# Patient Record
Sex: FEMALE | Race: WHITE | NOT HISPANIC OR LATINO | URBAN - METROPOLITAN AREA
[De-identification: names, ages, dates, MRNs, and addresses within clinical notes are randomized per-mention and may not be internally consistent; named-entity substitution may affect disease eponyms.]

---

## 2018-08-28 ENCOUNTER — INPATIENT (INPATIENT)
Facility: HOSPITAL | Age: 83
LOS: 2 days | Discharge: NURSING, REHAB OR LONG-TERM CARE | DRG: 482 | End: 2018-08-31
Attending: HOSPITALIST | Admitting: STUDENT IN AN ORGANIZED HEALTH CARE EDUCATION/TRAINING PROGRAM
Payer: MEDICARE

## 2018-08-28 ENCOUNTER — TRANSCRIPTION ENCOUNTER (OUTPATIENT)
Age: 83
End: 2018-08-28

## 2018-08-28 VITALS
TEMPERATURE: 98 F | DIASTOLIC BLOOD PRESSURE: 95 MMHG | HEART RATE: 94 BPM | OXYGEN SATURATION: 95 % | RESPIRATION RATE: 14 BRPM | SYSTOLIC BLOOD PRESSURE: 169 MMHG | HEIGHT: 64 IN | WEIGHT: 134.92 LBS

## 2018-08-28 DIAGNOSIS — Z29.9 ENCOUNTER FOR PROPHYLACTIC MEASURES, UNSPECIFIED: ICD-10-CM

## 2018-08-28 DIAGNOSIS — S72.009A FRACTURE OF UNSPECIFIED PART OF NECK OF UNSPECIFIED FEMUR, INITIAL ENCOUNTER FOR CLOSED FRACTURE: ICD-10-CM

## 2018-08-28 LAB
ALBUMIN SERPL ELPH-MCNC: 3.4 G/DL — SIGNIFICANT CHANGE UP (ref 3.3–5)
ALP SERPL-CCNC: 79 U/L — SIGNIFICANT CHANGE UP (ref 40–120)
ALT FLD-CCNC: 20 U/L — SIGNIFICANT CHANGE UP (ref 12–78)
ANION GAP SERPL CALC-SCNC: 9 MMOL/L — SIGNIFICANT CHANGE UP (ref 5–17)
APTT BLD: 27.3 SEC — LOW (ref 27.5–37.4)
AST SERPL-CCNC: 17 U/L — SIGNIFICANT CHANGE UP (ref 15–37)
BASOPHILS # BLD AUTO: 0.03 K/UL — SIGNIFICANT CHANGE UP (ref 0–0.2)
BASOPHILS NFR BLD AUTO: 0.3 % — SIGNIFICANT CHANGE UP (ref 0–2)
BILIRUB SERPL-MCNC: 0.5 MG/DL — SIGNIFICANT CHANGE UP (ref 0.2–1.2)
BUN SERPL-MCNC: 20 MG/DL — SIGNIFICANT CHANGE UP (ref 7–23)
CALCIUM SERPL-MCNC: 8.5 MG/DL — SIGNIFICANT CHANGE UP (ref 8.5–10.1)
CHLORIDE SERPL-SCNC: 105 MMOL/L — SIGNIFICANT CHANGE UP (ref 96–108)
CO2 SERPL-SCNC: 27 MMOL/L — SIGNIFICANT CHANGE UP (ref 22–31)
CREAT SERPL-MCNC: 0.78 MG/DL — SIGNIFICANT CHANGE UP (ref 0.5–1.3)
EOSINOPHIL # BLD AUTO: 0.07 K/UL — SIGNIFICANT CHANGE UP (ref 0–0.5)
EOSINOPHIL NFR BLD AUTO: 0.7 % — SIGNIFICANT CHANGE UP (ref 0–6)
GLUCOSE SERPL-MCNC: 179 MG/DL — HIGH (ref 70–99)
HCT VFR BLD CALC: 45.7 % — HIGH (ref 34.5–45)
HGB BLD-MCNC: 15.1 G/DL — SIGNIFICANT CHANGE UP (ref 11.5–15.5)
IMM GRANULOCYTES NFR BLD AUTO: 0.2 % — SIGNIFICANT CHANGE UP (ref 0–1.5)
INR BLD: 1.02 RATIO — SIGNIFICANT CHANGE UP (ref 0.88–1.16)
LYMPHOCYTES # BLD AUTO: 0.78 K/UL — LOW (ref 1–3.3)
LYMPHOCYTES # BLD AUTO: 7.4 % — LOW (ref 13–44)
MCHC RBC-ENTMCNC: 30.4 PG — SIGNIFICANT CHANGE UP (ref 27–34)
MCHC RBC-ENTMCNC: 33 GM/DL — SIGNIFICANT CHANGE UP (ref 32–36)
MCV RBC AUTO: 92 FL — SIGNIFICANT CHANGE UP (ref 80–100)
MONOCYTES # BLD AUTO: 0.87 K/UL — SIGNIFICANT CHANGE UP (ref 0–0.9)
MONOCYTES NFR BLD AUTO: 8.2 % — SIGNIFICANT CHANGE UP (ref 2–14)
NEUTROPHILS # BLD AUTO: 8.81 K/UL — HIGH (ref 1.8–7.4)
NEUTROPHILS NFR BLD AUTO: 83.2 % — HIGH (ref 43–77)
PLATELET # BLD AUTO: 219 K/UL — SIGNIFICANT CHANGE UP (ref 150–400)
POTASSIUM SERPL-MCNC: 3.8 MMOL/L — SIGNIFICANT CHANGE UP (ref 3.5–5.3)
POTASSIUM SERPL-SCNC: 3.8 MMOL/L — SIGNIFICANT CHANGE UP (ref 3.5–5.3)
PROT SERPL-MCNC: 6.7 G/DL — SIGNIFICANT CHANGE UP (ref 6–8.3)
PROTHROM AB SERPL-ACNC: 11.1 SEC — SIGNIFICANT CHANGE UP (ref 9.8–12.7)
RBC # BLD: 4.97 M/UL — SIGNIFICANT CHANGE UP (ref 3.8–5.2)
RBC # FLD: 13.7 % — SIGNIFICANT CHANGE UP (ref 10.3–14.5)
SODIUM SERPL-SCNC: 141 MMOL/L — SIGNIFICANT CHANGE UP (ref 135–145)
WBC # BLD: 10.58 K/UL — HIGH (ref 3.8–10.5)
WBC # FLD AUTO: 10.58 K/UL — HIGH (ref 3.8–10.5)

## 2018-08-28 PROCEDURE — 73552 X-RAY EXAM OF FEMUR 2/>: CPT | Mod: 26,RT

## 2018-08-28 PROCEDURE — 99223 1ST HOSP IP/OBS HIGH 75: CPT | Mod: AI,GC

## 2018-08-28 PROCEDURE — 93010 ELECTROCARDIOGRAM REPORT: CPT

## 2018-08-28 PROCEDURE — 99285 EMERGENCY DEPT VISIT HI MDM: CPT

## 2018-08-28 PROCEDURE — 73502 X-RAY EXAM HIP UNI 2-3 VIEWS: CPT | Mod: 26,RT

## 2018-08-28 PROCEDURE — 71045 X-RAY EXAM CHEST 1 VIEW: CPT | Mod: 26

## 2018-08-28 RX ORDER — HEPARIN SODIUM 5000 [USP'U]/ML
5000 INJECTION INTRAVENOUS; SUBCUTANEOUS EVERY 12 HOURS
Qty: 0 | Refills: 0 | Status: COMPLETED | OUTPATIENT
Start: 2018-08-28 | End: 2018-08-28

## 2018-08-28 RX ORDER — TRAMADOL HYDROCHLORIDE 50 MG/1
25 TABLET ORAL EVERY 6 HOURS
Qty: 0 | Refills: 0 | Status: DISCONTINUED | OUTPATIENT
Start: 2018-08-28 | End: 2018-08-29

## 2018-08-28 RX ORDER — TRAMADOL HYDROCHLORIDE 50 MG/1
50 TABLET ORAL EVERY 6 HOURS
Qty: 0 | Refills: 0 | Status: DISCONTINUED | OUTPATIENT
Start: 2018-08-28 | End: 2018-08-29

## 2018-08-28 RX ORDER — ACETAMINOPHEN 500 MG
650 TABLET ORAL ONCE
Qty: 0 | Refills: 0 | Status: COMPLETED | OUTPATIENT
Start: 2018-08-28 | End: 2018-08-28

## 2018-08-28 RX ORDER — MORPHINE SULFATE 50 MG/1
1 CAPSULE, EXTENDED RELEASE ORAL ONCE
Qty: 0 | Refills: 0 | Status: DISCONTINUED | OUTPATIENT
Start: 2018-08-28 | End: 2018-08-29

## 2018-08-28 RX ORDER — SODIUM CHLORIDE 9 MG/ML
1000 INJECTION INTRAMUSCULAR; INTRAVENOUS; SUBCUTANEOUS
Qty: 0 | Refills: 0 | Status: DISCONTINUED | OUTPATIENT
Start: 2018-08-28 | End: 2018-08-29

## 2018-08-28 RX ORDER — SODIUM CHLORIDE 9 MG/ML
3 INJECTION INTRAMUSCULAR; INTRAVENOUS; SUBCUTANEOUS ONCE
Qty: 0 | Refills: 0 | Status: COMPLETED | OUTPATIENT
Start: 2018-08-28 | End: 2018-08-28

## 2018-08-28 RX ORDER — ASPIRIN/CALCIUM CARB/MAGNESIUM 324 MG
81 TABLET ORAL DAILY
Qty: 0 | Refills: 0 | Status: DISCONTINUED | OUTPATIENT
Start: 2018-08-28 | End: 2018-08-28

## 2018-08-28 RX ORDER — ASPIRIN/CALCIUM CARB/MAGNESIUM 324 MG
1 TABLET ORAL
Qty: 0 | Refills: 0 | COMMUNITY

## 2018-08-28 RX ADMIN — SODIUM CHLORIDE 3 MILLILITER(S): 9 INJECTION INTRAMUSCULAR; INTRAVENOUS; SUBCUTANEOUS at 17:50

## 2018-08-28 RX ADMIN — Medication 650 MILLIGRAM(S): at 17:10

## 2018-08-28 RX ADMIN — TRAMADOL HYDROCHLORIDE 50 MILLIGRAM(S): 50 TABLET ORAL at 21:23

## 2018-08-28 RX ADMIN — HEPARIN SODIUM 5000 UNIT(S): 5000 INJECTION INTRAVENOUS; SUBCUTANEOUS at 21:24

## 2018-08-28 NOTE — ED ADULT NURSE NOTE - OBJECTIVE STATEMENT
Pt. stated, " I fell while walking out of [employer]". Pt. complaining of right thigh pain. Denied hitting head or loss of consciousness. Daughter present at bedside during assessment. Pt. stated, " I fell while walking out of [employer]". Pt. complaining of right thigh pain. Denied hitting head or loss of consciousness. Daughter present at bedside during assessment. Ecchymosis noted to left hip, per pt. she "bumped" it on a dresser. Right hip and lower extremity noted with ecchymosis. Abrasion noted to right elbow. Blanchable redness noted to sacrum.

## 2018-08-28 NOTE — ED ADULT NURSE NOTE - NSIMPLEMENTINTERV_GEN_ALL_ED
Implemented All Fall with Harm Risk Interventions:  [place] to call system. Call bell, personal items and telephone within reach. Instruct patient to call for assistance. Room bathroom lighting operational. Non-slip footwear when patient is off stretcher. Physically safe environment: no spills, clutter or unnecessary equipment. Stretcher in lowest position, wheels locked, appropriate side rails in place. Provide visual cue, wrist band, yellow gown, etc. Monitor gait and stability. Monitor for mental status changes and reorient to person, place, and time. Review medications for side effects contributing to fall risk. Reinforce activity limits and safety measures with patient and family. Provide visual clues: red socks.

## 2018-08-28 NOTE — ED PROVIDER NOTE - PHYSICAL EXAMINATION
Spine Exam:     Cervical: No erythema, ecchymosis, or visible deformity. No midline tenderness or step-off appreciated on palpation. No paravertebral tenderness. No muscle spasm. FROM. NEG NEXUS criteria.       MS R LE: + RIGHT HIP TTP. DECREASED ROM OF HIP. FROM OF KNEE AND ANKLE. SENSATION GROSSLY INTACT.   PV:+ 2 DP. Spine Exam:     Cervical: No erythema, ecchymosis, or visible deformity. No midline tenderness or step-off appreciated on palpation. No paravertebral tenderness. No muscle spasm. FROM. NEG NEXUS criteria.       MS R LE: + RIGHT HIP TTP. DECREASED ROM OF HIP. FROM OF KNEE AND ANKLE. SENSATION GROSSLY INTACT. R LE SHORTER THAN LEFT   PV:+ 2 DP.

## 2018-08-28 NOTE — H&P ADULT - HISTORY OF PRESENT ILLNESS
89yo female with no significant past medical history presents with c/o right hip pain. Patient states she was walking in a parking after lunch, and tripped over an uneven surface. Patient states that she fell onto her right side, bracing herself with her posterior right arm. Patient denies striking her head or any LOC. Patient reports experiencing pain when she attempted to rise from the floor, and was transported to [facility] for further medical evaluation.    In the ED, patient vitals were T(F): 98.2, HR: 94, BP: 169/95, RR: 14, SpO2: 95% on RA. CBC exhibited WBC 10.58, Hgb 15.1, Hct 45.7, Plt 219. CMP exhibited Na 141, K 3.8, Cl 105, CO2 27, BUN 20, Cr 0.78, Gluc 179. EKG exhibited NSR with voltage criteria for LVH. CXR showed left atelectasis vs. scarring at base. Xray of right femur showed an impacted intertrochanteric fracture, with right femoral head intact without cortical collapse or osteonecrosis. In the ED, patient was administered Tylenol 650mg PO x 1.

## 2018-08-28 NOTE — CONSULT NOTE ADULT - SUBJECTIVE AND OBJECTIVE BOX
89y Female community ambulatory presents c/o R hip pain and inability to ambulate sp mechanical fall. Denies HS/LOC. Denies numbness/tingling. Denies fever/chills. Denies pain/injury elsewhere.     HEALTH ISSUES - PROBLEM Dx:  Preventive measure: Preventive measure  Hip fracture: Hip fracture        MEDICATIONS  (STANDING):  multivitamin 1 Tablet(s) Oral daily  sodium chloride 0.9%. 1000 milliLiter(s) IV Continuous <Continuous>    Allergies    fresh fruit (Short breath)  No Known Drug Allergies  Tree Nuts (Short breath)    Intolerances                            15.1   10.58 )-----------( 219      ( 28 Aug 2018 17:59 )             45.7     28 Aug 2018 17:59    141    |  105    |  20     ----------------------------<  179    3.8     |  27     |  0.78     Ca    8.5        28 Aug 2018 17:59    TPro  6.7    /  Alb  3.4    /  TBili  0.5    /  DBili  x      /  AST  17     /  ALT  20     /  AlkPhos  79     28 Aug 2018 17:59    PT/INR - ( 28 Aug 2018 17:59 )   PT: 11.1 sec;   INR: 1.02 ratio         PTT - ( 28 Aug 2018 17:59 )  PTT:27.3 sec  Vital Signs Last 24 Hrs  T(C): 36.8 (08-28-18 @ 21:27), Max: 36.8 (08-28-18 @ 16:24)  T(F): 98.3 (08-28-18 @ 21:27), Max: 98.3 (08-28-18 @ 21:27)  HR: 88 (08-28-18 @ 21:27) (84 - 94)  BP: 161/83 (08-28-18 @ 21:27) (161/83 - 169/95)  BP(mean): --  RR: 15 (08-28-18 @ 21:27) (14 - 16)  SpO2: 96% (08-28-18 @ 21:27) (95% - 96%)  Imaging: XR demonstates R hip IT fracture    Physical Exam  Gen: NAD  RLE: skin intact, unable to SLR RLE, + log roll RLE, +ttp hip/groin, no ttp elsewhere, +ehl/fhl/ta/gs function, no calf ttp, dp/pt pulse intact, compartments soft  Secondary survey: benign, nv intact, able to SLR contralateral leg, negative log roll contralateral leg, no bony ttp elsewhere    A/P: 89y Female with R hip IT fracture  Pain control  NWB RLE, bedrest  FU labs/imaging  Ca/Vit D  Outpt osteoporosis workup  Admit to medical team  Medical clearance/optimization for OR  Discussed with Dr. Hager  Plan for OR tomorrow for R hip IMN  NPO after midnight  IVF while NPO  Hold AC after midnight

## 2018-08-28 NOTE — H&P ADULT - ASSESSMENT
89yo female with no significant past medical history presents with c/o right hip pain, admitted for right intertrochanteric fracture 2/2 mechanical fall

## 2018-08-28 NOTE — ED PROVIDER NOTE - PROGRESS NOTE DETAILS
consulted ortho dr lawler assistant asha Kowalski who advised to admit to medicine. discussed with pt who agrees consulted ortho dr lawler assistant asha Kowalski who advised to admit to medicine. discussed with pt who agrees. will admit to dr barnhart

## 2018-08-28 NOTE — H&P ADULT - NSHPREVIEWOFSYSTEMS_GEN_ALL_CORE
Constitutional: denies fever, chills, diaphoresis   HEENT: denies blurry vision, difficulty hearing  Respiratory: denies SOB, ARDON, cough, sputum production, wheezing, hemoptysis  Cardiovascular: denies CP, palpitations, edema  Gastrointestinal: denies nausea, vomiting, diarrhea, constipation, abdominal pain, melena, hematochezia   Genitourinary: denies dysuria, frequency, urgency, hematuria   Skin/Breast: denies rash, itching  Musculoskeletal: + Right hip pain with movement, denies myalgias, joint swelling, muscle weakness  Neurologic: denies headache, weakness, dizziness, paresthesias, numbness/tingling  Psychiatric: denies feeling anxious, depressed, suicidal, homicidal thoughts  Hematology/Oncology: denies bruising, tender or enlarged lymph nodes   ROS negative except as noted above

## 2018-08-28 NOTE — H&P ADULT - ATTENDING COMMENTS
Pt has agreed to trial tramadol for pain, however she would like to avoid other opioids if possible.

## 2018-08-28 NOTE — H&P ADULT - PROBLEM SELECTOR PLAN 1
2/2 mechanical fall  Ortho consult (Dr. Sheriff) treatment recommendations appreciated  Operative management as per ortho surgery  Hold home ASA  NPO after midnight for procedure  Tylenol for 4-6, Tramadol for 7-10  PT eval post-op  No clear evidence of acute ischemia on EKG, no history of MI, active CAD, ADHF or severe valvular disease and in the setting of low risk hip fracture repair procedure, patient is optimized from cardiovascular standpoint to proceed with planned procedure with routine hemodynamic monitoring.

## 2018-08-28 NOTE — ED PROVIDER NOTE - ATTENDING CONTRIBUTION TO CARE
Pt seen and examined and d/w PA.  agree with a and p.  pt is a 89 yo female sp slip and fall onto buttocks pw right hip pain. pt biba. pt denies head trauma, n/v, numbness,weakness or other injury but co right groin pain.  fall witnessed, on exam nad while still, abd soft, nt, lungs cta.  right hip ttp with mild external rotation.  xray with fx.  pain meds prn, preop labs, ortho consult , admit med

## 2018-08-28 NOTE — ED PROVIDER NOTE - OBJECTIVE STATEMENT
pt is a 89yo female with no significant pmhx c/o fall x  today. pt reports she tripped and fell on her right side,no head injury or loc. pt reports right hip and thigh pain. pt did not take anything for pain. pt unable to ambulate on extremity after fall. pt denies fever, cp, sob pt is a 89yo female with no significant pmhx c/o fall x  today. pt reports she tripped and fell on her right side,no head injury or loc. pt reports right hip and thigh pain. pt did not take anything for pain. pt unable to ambulate on extremity after fall. pt denies fever, cp, sob  pmd: none

## 2018-08-28 NOTE — ED ADULT NURSE REASSESSMENT NOTE - NSIMPLEMENTINTERV_GEN_ALL_ED
Implemented All Fall with Harm Risk Interventions:  [place] to call system. Call bell, personal items and telephone within reach. Instruct patient to call for assistance. Room bathroom lighting operational. Non-slip footwear when patient is off stretcher. Physically safe environment: no spills, clutter or unnecessary equipment. Stretcher in lowest position, wheels locked, appropriate side rails in place. Provide visual cue, wrist band, yellow gown, etc. Monitor gait and stability. Monitor for mental status changes and reorient to person, place, and time. Review medications for side effects contributing to fall risk. Reinforce activity limits and safety measures with patient and family. Provide visual clues: red socks.

## 2018-08-28 NOTE — H&P ADULT - NSHPPHYSICALEXAM_GEN_ALL_CORE
Physical Exam:  General: elderly, well nourished, NAD  HEENT: NCAT, PERRLA, EOMI bl, moist mucous membranes   Neck: Supple, nontender, no mass  Neurology: A&Ox3, nonfocal, CN II-XII grossly intact, sensation intact   Respiratory: CTA B/L, No W/R/R  CV: RRR, +S1/S2, no murmurs, rubs or gallops  Abdominal: Soft, NT, ND +BSx4  Extremities:  + peripheral pulses, trace edema of right ankle  MSK: limited active ROM of right lower extremity, pain with resisted flexion/abduction/adduction of right hip, + log roll of right lower extremity, no joint erythema or warmth   Skin: warm, dry, normal color, no rash or abnormal lesions

## 2018-08-29 ENCOUNTER — TRANSCRIPTION ENCOUNTER (OUTPATIENT)
Age: 83
End: 2018-08-29

## 2018-08-29 LAB
ANION GAP SERPL CALC-SCNC: 8 MMOL/L — SIGNIFICANT CHANGE UP (ref 5–17)
ANION GAP SERPL CALC-SCNC: 9 MMOL/L — SIGNIFICANT CHANGE UP (ref 5–17)
APTT BLD: 27.5 SEC — SIGNIFICANT CHANGE UP (ref 27.5–37.4)
BUN SERPL-MCNC: 10 MG/DL — SIGNIFICANT CHANGE UP (ref 7–23)
BUN SERPL-MCNC: 12 MG/DL — SIGNIFICANT CHANGE UP (ref 7–23)
CALCIUM SERPL-MCNC: 7.4 MG/DL — LOW (ref 8.5–10.1)
CALCIUM SERPL-MCNC: 8 MG/DL — LOW (ref 8.5–10.1)
CHLORIDE SERPL-SCNC: 107 MMOL/L — SIGNIFICANT CHANGE UP (ref 96–108)
CHLORIDE SERPL-SCNC: 107 MMOL/L — SIGNIFICANT CHANGE UP (ref 96–108)
CO2 SERPL-SCNC: 23 MMOL/L — SIGNIFICANT CHANGE UP (ref 22–31)
CO2 SERPL-SCNC: 26 MMOL/L — SIGNIFICANT CHANGE UP (ref 22–31)
CREAT SERPL-MCNC: 0.44 MG/DL — LOW (ref 0.5–1.3)
CREAT SERPL-MCNC: 0.57 MG/DL — SIGNIFICANT CHANGE UP (ref 0.5–1.3)
GLUCOSE SERPL-MCNC: 124 MG/DL — HIGH (ref 70–99)
GLUCOSE SERPL-MCNC: 131 MG/DL — HIGH (ref 70–99)
HCT VFR BLD CALC: 39.1 % — SIGNIFICANT CHANGE UP (ref 34.5–45)
HCT VFR BLD CALC: 39.3 % — SIGNIFICANT CHANGE UP (ref 34.5–45)
HGB BLD-MCNC: 13.3 G/DL — SIGNIFICANT CHANGE UP (ref 11.5–15.5)
HGB BLD-MCNC: 13.4 G/DL — SIGNIFICANT CHANGE UP (ref 11.5–15.5)
INR BLD: 1.09 RATIO — SIGNIFICANT CHANGE UP (ref 0.88–1.16)
MCHC RBC-ENTMCNC: 30.5 PG — SIGNIFICANT CHANGE UP (ref 27–34)
MCHC RBC-ENTMCNC: 31.3 PG — SIGNIFICANT CHANGE UP (ref 27–34)
MCHC RBC-ENTMCNC: 34 GM/DL — SIGNIFICANT CHANGE UP (ref 32–36)
MCHC RBC-ENTMCNC: 34.1 GM/DL — SIGNIFICANT CHANGE UP (ref 32–36)
MCV RBC AUTO: 89.5 FL — SIGNIFICANT CHANGE UP (ref 80–100)
MCV RBC AUTO: 92 FL — SIGNIFICANT CHANGE UP (ref 80–100)
NRBC # BLD: 0 /100 WBCS — SIGNIFICANT CHANGE UP (ref 0–0)
NRBC # BLD: 0 /100 WBCS — SIGNIFICANT CHANGE UP (ref 0–0)
PLATELET # BLD AUTO: 167 K/UL — SIGNIFICANT CHANGE UP (ref 150–400)
PLATELET # BLD AUTO: 170 K/UL — SIGNIFICANT CHANGE UP (ref 150–400)
POTASSIUM SERPL-MCNC: 3.5 MMOL/L — SIGNIFICANT CHANGE UP (ref 3.5–5.3)
POTASSIUM SERPL-MCNC: 3.6 MMOL/L — SIGNIFICANT CHANGE UP (ref 3.5–5.3)
POTASSIUM SERPL-SCNC: 3.5 MMOL/L — SIGNIFICANT CHANGE UP (ref 3.5–5.3)
POTASSIUM SERPL-SCNC: 3.6 MMOL/L — SIGNIFICANT CHANGE UP (ref 3.5–5.3)
PROTHROM AB SERPL-ACNC: 11.9 SEC — SIGNIFICANT CHANGE UP (ref 9.8–12.7)
RBC # BLD: 4.25 M/UL — SIGNIFICANT CHANGE UP (ref 3.8–5.2)
RBC # BLD: 4.39 M/UL — SIGNIFICANT CHANGE UP (ref 3.8–5.2)
RBC # FLD: 13.6 % — SIGNIFICANT CHANGE UP (ref 10.3–14.5)
RBC # FLD: 13.7 % — SIGNIFICANT CHANGE UP (ref 10.3–14.5)
SODIUM SERPL-SCNC: 139 MMOL/L — SIGNIFICANT CHANGE UP (ref 135–145)
SODIUM SERPL-SCNC: 141 MMOL/L — SIGNIFICANT CHANGE UP (ref 135–145)
WBC # BLD: 6.3 K/UL — SIGNIFICANT CHANGE UP (ref 3.8–10.5)
WBC # BLD: 8.76 K/UL — SIGNIFICANT CHANGE UP (ref 3.8–10.5)
WBC # FLD AUTO: 6.3 K/UL — SIGNIFICANT CHANGE UP (ref 3.8–10.5)
WBC # FLD AUTO: 8.76 K/UL — SIGNIFICANT CHANGE UP (ref 3.8–10.5)

## 2018-08-29 PROCEDURE — 99232 SBSQ HOSP IP/OBS MODERATE 35: CPT

## 2018-08-29 RX ORDER — ASPIRIN/CALCIUM CARB/MAGNESIUM 324 MG
81 TABLET ORAL DAILY
Qty: 0 | Refills: 0 | Status: DISCONTINUED | OUTPATIENT
Start: 2018-08-29 | End: 2018-08-31

## 2018-08-29 RX ORDER — ACETAMINOPHEN 500 MG
650 TABLET ORAL EVERY 6 HOURS
Qty: 0 | Refills: 0 | Status: DISCONTINUED | OUTPATIENT
Start: 2018-08-29 | End: 2018-08-31

## 2018-08-29 RX ORDER — ACETAMINOPHEN 500 MG
650 TABLET ORAL EVERY 8 HOURS
Qty: 0 | Refills: 0 | Status: DISCONTINUED | OUTPATIENT
Start: 2018-08-29 | End: 2018-08-31

## 2018-08-29 RX ORDER — SODIUM CHLORIDE 9 MG/ML
1000 INJECTION INTRAMUSCULAR; INTRAVENOUS; SUBCUTANEOUS
Qty: 0 | Refills: 0 | Status: DISCONTINUED | OUTPATIENT
Start: 2018-08-29 | End: 2018-08-30

## 2018-08-29 RX ORDER — SODIUM CHLORIDE 9 MG/ML
1000 INJECTION, SOLUTION INTRAVENOUS
Qty: 0 | Refills: 0 | Status: DISCONTINUED | OUTPATIENT
Start: 2018-08-29 | End: 2018-08-29

## 2018-08-29 RX ORDER — CEFAZOLIN SODIUM 1 G
2000 VIAL (EA) INJECTION ONCE
Qty: 0 | Refills: 0 | Status: COMPLETED | OUTPATIENT
Start: 2018-08-29 | End: 2018-08-29

## 2018-08-29 RX ORDER — POLYETHYLENE GLYCOL 3350 17 G/17G
17 POWDER, FOR SOLUTION ORAL DAILY
Qty: 0 | Refills: 0 | Status: DISCONTINUED | OUTPATIENT
Start: 2018-08-29 | End: 2018-08-31

## 2018-08-29 RX ORDER — MORPHINE SULFATE 50 MG/1
2 CAPSULE, EXTENDED RELEASE ORAL
Qty: 0 | Refills: 0 | Status: DISCONTINUED | OUTPATIENT
Start: 2018-08-29 | End: 2018-08-29

## 2018-08-29 RX ORDER — DOCUSATE SODIUM 100 MG
100 CAPSULE ORAL THREE TIMES A DAY
Qty: 0 | Refills: 0 | Status: DISCONTINUED | OUTPATIENT
Start: 2018-08-29 | End: 2018-08-31

## 2018-08-29 RX ORDER — BENZOCAINE AND MENTHOL 5; 1 G/100ML; G/100ML
1 LIQUID ORAL
Qty: 0 | Refills: 0 | Status: DISCONTINUED | OUTPATIENT
Start: 2018-08-29 | End: 2018-08-31

## 2018-08-29 RX ORDER — ONDANSETRON 8 MG/1
4 TABLET, FILM COATED ORAL EVERY 6 HOURS
Qty: 0 | Refills: 0 | Status: DISCONTINUED | OUTPATIENT
Start: 2018-08-29 | End: 2018-08-31

## 2018-08-29 RX ORDER — MORPHINE SULFATE 50 MG/1
2 CAPSULE, EXTENDED RELEASE ORAL EVERY 4 HOURS
Qty: 0 | Refills: 0 | Status: DISCONTINUED | OUTPATIENT
Start: 2018-08-29 | End: 2018-08-31

## 2018-08-29 RX ORDER — DIPHENHYDRAMINE HCL 50 MG
25 CAPSULE ORAL EVERY 4 HOURS
Qty: 0 | Refills: 0 | Status: DISCONTINUED | OUTPATIENT
Start: 2018-08-29 | End: 2018-08-31

## 2018-08-29 RX ORDER — ENOXAPARIN SODIUM 100 MG/ML
40 INJECTION SUBCUTANEOUS EVERY 24 HOURS
Qty: 0 | Refills: 0 | Status: DISCONTINUED | OUTPATIENT
Start: 2018-08-30 | End: 2018-08-31

## 2018-08-29 RX ORDER — CEFAZOLIN SODIUM 1 G
2000 VIAL (EA) INJECTION EVERY 8 HOURS
Qty: 0 | Refills: 0 | Status: COMPLETED | OUTPATIENT
Start: 2018-08-29 | End: 2018-08-30

## 2018-08-29 RX ORDER — OXYCODONE HYDROCHLORIDE 5 MG/1
5 TABLET ORAL EVERY 4 HOURS
Qty: 0 | Refills: 0 | Status: DISCONTINUED | OUTPATIENT
Start: 2018-08-29 | End: 2018-08-31

## 2018-08-29 RX ADMIN — TRAMADOL HYDROCHLORIDE 50 MILLIGRAM(S): 50 TABLET ORAL at 11:42

## 2018-08-29 RX ADMIN — SODIUM CHLORIDE 75 MILLILITER(S): 9 INJECTION INTRAMUSCULAR; INTRAVENOUS; SUBCUTANEOUS at 14:13

## 2018-08-29 RX ADMIN — SODIUM CHLORIDE 75 MILLILITER(S): 9 INJECTION, SOLUTION INTRAVENOUS at 20:20

## 2018-08-29 RX ADMIN — MORPHINE SULFATE 2 MILLIGRAM(S): 50 CAPSULE, EXTENDED RELEASE ORAL at 20:23

## 2018-08-29 RX ADMIN — SODIUM CHLORIDE 75 MILLILITER(S): 9 INJECTION INTRAMUSCULAR; INTRAVENOUS; SUBCUTANEOUS at 00:00

## 2018-08-29 RX ADMIN — TRAMADOL HYDROCHLORIDE 50 MILLIGRAM(S): 50 TABLET ORAL at 05:41

## 2018-08-29 RX ADMIN — MORPHINE SULFATE 2 MILLIGRAM(S): 50 CAPSULE, EXTENDED RELEASE ORAL at 21:20

## 2018-08-29 RX ADMIN — Medication 1 TABLET(S): at 11:40

## 2018-08-29 RX ADMIN — MORPHINE SULFATE 1 MILLIGRAM(S): 50 CAPSULE, EXTENDED RELEASE ORAL at 00:19

## 2018-08-29 RX ADMIN — SODIUM CHLORIDE 125 MILLILITER(S): 9 INJECTION INTRAMUSCULAR; INTRAVENOUS; SUBCUTANEOUS at 22:24

## 2018-08-29 RX ADMIN — Medication 650 MILLIGRAM(S): at 23:43

## 2018-08-29 RX ADMIN — TRAMADOL HYDROCHLORIDE 50 MILLIGRAM(S): 50 TABLET ORAL at 12:02

## 2018-08-29 RX ADMIN — MORPHINE SULFATE 2 MILLIGRAM(S): 50 CAPSULE, EXTENDED RELEASE ORAL at 21:05

## 2018-08-29 RX ADMIN — MORPHINE SULFATE 2 MILLIGRAM(S): 50 CAPSULE, EXTENDED RELEASE ORAL at 20:33

## 2018-08-29 NOTE — PROGRESS NOTE ADULT - SUBJECTIVE AND OBJECTIVE BOX
CHIEF COMPLAINT/INTERVAL HISTORY:  Pt. seen and evaluated for R. hip fracture.  Pt. reports having some pain along lateral thigh region.      REVIEW OF SYSTEMS:  No fever, CP, SOB, or abdominal pain    Vital Signs Last 24 Hrs  T(C): 36.4 (29 Aug 2018 04:48), Max: 36.8 (28 Aug 2018 16:24)  T(F): 97.6 (29 Aug 2018 04:48), Max: 98.3 (28 Aug 2018 21:27)  HR: 82 (29 Aug 2018 04:48) (78 - 94)  BP: 162/73 (29 Aug 2018 04:48) (161/83 - 178/83)  BP(mean): --  RR: 15 (29 Aug 2018 04:48) (14 - 16)  SpO2: 95% (29 Aug 2018 04:48) (94% - 96%)    PHYSICAL EXAM:  GENERAL: NAD, anxious about the surgery  HEENT: EOMI, hearing normal, conjunctiva and sclera clear  Chest: CTA bilaterally, no wheezing  CV: S1S2, RRR,   GI: soft, +BS, NT/ND  Musculoskeletal: no edema, RLE externally rotated  Psychiatric: affect nL, mood nL  Skin: warm and dry    LABS:                        13.4   6.30  )-----------( 170      ( 29 Aug 2018 06:34 )             39.3     08-29    141  |  107  |  12  ----------------------------<  131<H>  3.5   |  26  |  0.44<L>    Ca    8.0<L>      29 Aug 2018 06:34    TPro  6.7  /  Alb  3.4  /  TBili  0.5  /  DBili  x   /  AST  17  /  ALT  20  /  AlkPhos  79  08-28    PT/INR - ( 29 Aug 2018 06:34 )   PT: 11.9 sec;   INR: 1.09 ratio         PTT - ( 29 Aug 2018 06:34 )  PTT:27.5 sec      Assessment and Plan:  -R. hip fracture:  Continue Tramadol PRN.  Bedrest for now.  Pt. scheduled for IMN  of R. hip.  Orthopedic f/u.  Pt. is without s/s of ACS or decompensated CHF.  No absolute contraindication from medical perspective to proceed with planned orthopedic surgery.   -VTE ppx:  SCD

## 2018-08-29 NOTE — DISCHARGE NOTE ADULT - MEDICATION SUMMARY - MEDICATIONS TO TAKE
I will START or STAY ON the medications listed below when I get home from the hospital:    acetaminophen 325 mg oral tablet  -- 2 tab(s) by mouth every 8 hours, As needed, Mild Pain  -- Indication: For Pain control    oxyCODONE 5 mg oral tablet  -- 1 tab(s) by mouth every 4 hours, As needed, Moderate Pain (4 - 6)  -- Indication: For Pain control    aspirin 81 mg oral tablet  -- 1 tab(s) by mouth once a day  -- Indication: For Preventive measure    enoxaparin  -- 40 milligram(s) subcutaneous once a day  -- Indication: For VTE prophylaxis    docusate sodium 100 mg oral capsule  -- 1 cap(s) by mouth 3 times a day  -- Indication: For bowel regimen while on narcotics    polyethylene glycol 3350 oral powder for reconstitution  -- 17 gram(s) by mouth once a day  -- Indication: For bowel regimen while on narcotics    Multiple Vitamins oral tablet  -- 1 tab(s) by mouth once a day  -- Indication: For supplement

## 2018-08-29 NOTE — DISCHARGE NOTE ADULT - CARE PLAN
Principal Discharge DX:	Intertrochanteric fracture of right femur  Goal:	Return to ADLs  Assessment and plan of treatment:	1.	Pain Control  2.	Walking with full weight bearing as tolerated, with assistive devices (walker/Cane as Needed)  3.	DVT Prophylaxis for 30 days  4.	PT as needed  5.	Follow up with Dr. Wilkerosn as Outpatient in 10-14 Days after Discharge from the Hospital or Rehab. Call Office For Appointment.  6.	Remove Staples Post-Op Day 14, and Remove Dressing Post-Op Day 10, with Daily Dressing Changes as Need.  7.	Ice affected area as Needed  8.	Keep Dressing  Clean and dry. Principal Discharge DX:	Intertrochanteric fracture of right femur  Goal:	Return to ADLs  Assessment and plan of treatment:	1.	Pain Control  2.	Walking with full weight bearing as tolerated, with assistive devices (walker/Cane as Needed)  3.	DVT Prophylaxis for 30 days  4.	PT as needed  5.	Follow up with Dr. Sheriff as Outpatient in 10-14 Days after Discharge from the Hospital or Rehab. Call Office For Appointment.  6.	Remove Staples Post-Op Day 14, and Remove Dressing Post-Op Day 10, with Daily Dressing Changes as Need.  7.	Ice affected area as Needed  8.	Keep Dressing  Clean and dry.

## 2018-08-29 NOTE — DISCHARGE NOTE ADULT - PATIENT PORTAL LINK FT
You can access the [email or web address], offered by [facility], by registering with the following website: [email or web address]

## 2018-08-29 NOTE — DISCHARGE NOTE ADULT - CARE PROVIDER_API CALL
Josr Sheriff), Orthopaedic Surgery  [address] [place] [address]  Phone: [phone]  Fax: [phone]

## 2018-08-29 NOTE — PROGRESS NOTE ADULT - ASSESSMENT
A/P: 89F s/p R IMN POD 0  Analgesia  DVT ppx to start in AM  SCD  WBAT RLE  Advance diet as tolerated  Encourage incentive spirometry  FU Labs  PT/OT  Will discuss with attending and advise if plan changes

## 2018-08-29 NOTE — DISCHARGE NOTE ADULT - HOSPITAL COURSE
89yo female with no significant past medical history presents with c/o right hip pain. Patient states she was walking in a parking lot after lunch, and tripped over an uneven surface. Patient states that she fell onto her right side, bracing herself with her posterior right arm. Patient denies striking her head or any LOC. Patient reports experiencing pain when she attempted to rise from the floor, and was transported to [facility] for further medical evaluation.    In the ED, patient vitals were T(F): 98.2, HR: 94, BP: 169/95, RR: 14, SpO2: 95% on RA. CBC exhibited WBC 10.58, Hgb 15.1, Hct 45.7, Plt 219. CMP exhibited Na 141, K 3.8, Cl 105, CO2 27, BUN 20, Cr 0.78, Gluc 179. EKG exhibited NSR with voltage criteria for LVH. CXR showed left atelectasis vs. scarring at base. Xray of right femur showed an impacted intertrochanteric fracture, with right femoral head intact without cortical collapse or osteonecrosis. In the ED, patient was administered Tylenol 650mg PO x 1.     Pt. had orthopedic consultation and was admitted.  She was given analgesics.  She then underwent intramedullary nailing of the femur and tolerated procedure well. 89yo female with no significant past medical history presents with c/o right hip pain. Patient states she was walking in a parking lot after lunch, and tripped over an uneven surface. Patient states that she fell onto her right side, bracing herself with her posterior right arm. Patient denies striking her head or any LOC. Patient reports experiencing pain when she attempted to rise from the floor, and was transported to [facility] for further medical evaluation.    In the ED, patient vitals were T(F): 98.2, HR: 94, BP: 169/95, RR: 14, SpO2: 95% on RA. CBC exhibited WBC 10.58, Hgb 15.1, Hct 45.7, Plt 219. CMP exhibited Na 141, K 3.8, Cl 105, CO2 27, BUN 20, Cr 0.78, Gluc 179. EKG exhibited NSR with voltage criteria for LVH. CXR showed left atelectasis vs. scarring at base. Xray of right femur showed an impacted intertrochanteric fracture, with right femoral head intact without cortical collapse or osteonecrosis. In the ED, patient was administered Tylenol 650mg PO x 1.     Pt. had orthopedic consultation and was admitted.  She was given analgesics.  She then underwent intramedullary nailing of the femur and tolerated procedure well.  She had physical therapy evaluation and was recommended to be discharged to [facility].    On day of discharge patient is in no distress.  Afebrile and hemodynamically stable.    Completion of discharge in 32 minutes.

## 2018-08-29 NOTE — DISCHARGE NOTE ADULT - PLAN OF CARE
Return to ADLs 1.	Pain Control  2.	Walking with full weight bearing as tolerated, with assistive devices (walker/Cane as Needed)  3.	DVT Prophylaxis for 30 days  4.	PT as needed  5.	Follow up with Dr. Wilkerson as Outpatient in 10-14 Days after Discharge from the Hospital or Rehab. Call Office For Appointment.  6.	Remove Staples Post-Op Day 14, and Remove Dressing Post-Op Day 10, with Daily Dressing Changes as Need.  7.	Ice affected area as Needed  8.	Keep Dressing  Clean and dry. 1.	Pain Control  2.	Walking with full weight bearing as tolerated, with assistive devices (walker/Cane as Needed)  3.	DVT Prophylaxis for 30 days  4.	PT as needed  5.	Follow up with Dr. Sheriff as Outpatient in 10-14 Days after Discharge from the Hospital or Rehab. Call Office For Appointment.  6.	Remove Staples Post-Op Day 14, and Remove Dressing Post-Op Day 10, with Daily Dressing Changes as Need.  7.	Ice affected area as Needed  8.	Keep Dressing  Clean and dry.

## 2018-08-29 NOTE — PATIENT PROFILE ADULT. - PAIN SCALE PREFERRED, PROFILE
"Ela Kruse is a 10 y.o. male.     History of Present Illness     Patient is here with his mother to follow-up on his ADHD.  He has been doing well on his current dose of Vyvanse 50 mg by mouth every morning.  He is sleeping well with the melatonin.  No concerns today.    The following portions of the patient's history were reviewed and updated as appropriate: allergies, current medications, past social history and problem list.    Review of Systems   Constitutional: Negative for activity change, appetite change, chills, diaphoresis, fatigue, fever and irritability.   Respiratory: Negative for cough.    Gastrointestinal: Negative for abdominal pain, constipation, diarrhea and vomiting.   Neurological: Negative for light-headedness.   Psychiatric/Behavioral: Negative for agitation, behavioral problems, decreased concentration, dysphoric mood and sleep disturbance. The patient is not nervous/anxious and is not hyperactive.    All other systems reviewed and are negative.      Objective   /66  Ht 57\" (144.8 cm)  Wt 79 lb (35.8 kg)  BMI 17.1 kg/m2  Physical Exam   Constitutional: He appears well-developed and well-nourished. He is active. No distress.   HENT:   Head: Normocephalic and atraumatic.   Nose: Nose normal.   Mouth/Throat: Mucous membranes are moist. Dentition is normal. Oropharynx is clear.   Eyes: Conjunctivae and EOM are normal. Pupils are equal, round, and reactive to light.   Neck: Normal range of motion and full passive range of motion without pain. Neck supple.   Cardiovascular: Normal rate, regular rhythm, S1 normal and S2 normal.  Pulses are palpable.    No murmur heard.  Pulmonary/Chest: Effort normal and breath sounds normal. There is normal air entry. No respiratory distress.   Abdominal: Soft. Bowel sounds are normal.   Neurological: He is alert and oriented for age. No cranial nerve deficit. Gait normal.   Skin: Skin is warm. Capillary refill takes less than 3 seconds. "   Psychiatric: He has a normal mood and affect. His speech is normal and behavior is normal. Thought content normal.   Nursing note and vitals reviewed.      Assessment/Plan     Wisam was seen today for adhd.    Diagnoses and all orders for this visit:    Attention deficit hyperactivity disorder (ADHD), combined type    -     lisdexamfetamine (VYVANSE) 50 MG capsule; Take 1 capsule by mouth Every Morning.    Continue current medication regimen.  Follow-up in 3 months.  Call sooner with questions or concerns.      numerical 0-10

## 2018-08-29 NOTE — PROGRESS NOTE ADULT - ASSESSMENT
A/P: 89y Female with R hip IT fracture  Pain control  NWB RLE, bedrest  FU am labs  cleared for OR  Plan for OR today for R hip IMN  IVF while NPO  Hold AC

## 2018-08-29 NOTE — PROGRESS NOTE ADULT - SUBJECTIVE AND OBJECTIVE BOX
Orthopedic Post Op Check    Patient seen and examined at bedside. Reports no acute complaints at this time. Pain is well controlled. Patient tolerated procedure well    PHYSICAL EXAM:  Vital Signs Last 24 Hrs  T(C): 36.7 (29 Aug 2018 19:30), Max: 36.8 (28 Aug 2018 21:27)  T(F): 98.1 (29 Aug 2018 19:30), Max: 98.3 (28 Aug 2018 21:27)  HR: 77 (29 Aug 2018 19:30) (71 - 90)  BP: 127/66 (29 Aug 2018 19:30) (98/65 - 178/83)  RR: 14 (29 Aug 2018 19:30) (13 - 18)  SpO2: 95% (29 Aug 2018 19:30) (93% - 96%)    Gen: NAD; Resting comfortably  RLE: Aquacel bandage in place; Clean/dry/intact  SILT L3-S1  +EHL/FHL/TA/GSC  +2/4 DP  Compartments soft and compressible  No calf tenderness  Venodynes in place

## 2018-08-29 NOTE — PROGRESS NOTE ADULT - SUBJECTIVE AND OBJECTIVE BOX
89F with R hip IT Fx preop.  No overnight events.  Pain well controlled. No paresthesias.  NPO.    Vital Signs Last 24 Hrs  T(C): 36.4 (29 Aug 2018 04:48), Max: 36.8 (28 Aug 2018 16:24)  T(F): 97.6 (29 Aug 2018 04:48), Max: 98.3 (28 Aug 2018 21:27)  HR: 82 (29 Aug 2018 04:48) (78 - 94)  BP: 162/73 (29 Aug 2018 04:48) (161/83 - 178/83)  BP(mean): --  RR: 15 (29 Aug 2018 04:48) (14 - 16)  SpO2: 95% (29 Aug 2018 04:48) (94% - 96%)    Physical Exam  Gen: NAD  RLE: skin intact, unable to SLR RLE, + log roll RLE, +ttp hip/groin,  +ehl/fhl/ta/gs function, no calf ttp, dp/pt pulse intact, compartments soft

## 2018-08-30 LAB
ANION GAP SERPL CALC-SCNC: 8 MMOL/L — SIGNIFICANT CHANGE UP (ref 5–17)
BASOPHILS # BLD AUTO: 0.03 K/UL — SIGNIFICANT CHANGE UP (ref 0–0.2)
BASOPHILS NFR BLD AUTO: 0.4 % — SIGNIFICANT CHANGE UP (ref 0–2)
BUN SERPL-MCNC: 15 MG/DL — SIGNIFICANT CHANGE UP (ref 7–23)
CALCIUM SERPL-MCNC: 7.3 MG/DL — LOW (ref 8.5–10.1)
CHLORIDE SERPL-SCNC: 107 MMOL/L — SIGNIFICANT CHANGE UP (ref 96–108)
CO2 SERPL-SCNC: 24 MMOL/L — SIGNIFICANT CHANGE UP (ref 22–31)
CREAT SERPL-MCNC: 0.61 MG/DL — SIGNIFICANT CHANGE UP (ref 0.5–1.3)
EOSINOPHIL # BLD AUTO: 0.08 K/UL — SIGNIFICANT CHANGE UP (ref 0–0.5)
EOSINOPHIL NFR BLD AUTO: 1.1 % — SIGNIFICANT CHANGE UP (ref 0–6)
GLUCOSE SERPL-MCNC: 119 MG/DL — HIGH (ref 70–99)
HCT VFR BLD CALC: 31.6 % — LOW (ref 34.5–45)
HCT VFR BLD CALC: 31.9 % — LOW (ref 34.5–45)
HGB BLD-MCNC: 10.8 G/DL — LOW (ref 11.5–15.5)
HGB BLD-MCNC: 10.9 G/DL — LOW (ref 11.5–15.5)
IMM GRANULOCYTES NFR BLD AUTO: 0.3 % — SIGNIFICANT CHANGE UP (ref 0–1.5)
LYMPHOCYTES # BLD AUTO: 0.89 K/UL — LOW (ref 1–3.3)
LYMPHOCYTES # BLD AUTO: 12.3 % — LOW (ref 13–44)
MCHC RBC-ENTMCNC: 31.1 PG — SIGNIFICANT CHANGE UP (ref 27–34)
MCHC RBC-ENTMCNC: 34.2 GM/DL — SIGNIFICANT CHANGE UP (ref 32–36)
MCV RBC AUTO: 91.1 FL — SIGNIFICANT CHANGE UP (ref 80–100)
MONOCYTES # BLD AUTO: 0.89 K/UL — SIGNIFICANT CHANGE UP (ref 0–0.9)
MONOCYTES NFR BLD AUTO: 12.3 % — SIGNIFICANT CHANGE UP (ref 2–14)
NEUTROPHILS # BLD AUTO: 5.32 K/UL — SIGNIFICANT CHANGE UP (ref 1.8–7.4)
NEUTROPHILS NFR BLD AUTO: 73.6 % — SIGNIFICANT CHANGE UP (ref 43–77)
PLATELET # BLD AUTO: 165 K/UL — SIGNIFICANT CHANGE UP (ref 150–400)
POTASSIUM SERPL-MCNC: 3.7 MMOL/L — SIGNIFICANT CHANGE UP (ref 3.5–5.3)
POTASSIUM SERPL-SCNC: 3.7 MMOL/L — SIGNIFICANT CHANGE UP (ref 3.5–5.3)
RBC # BLD: 3.5 M/UL — LOW (ref 3.8–5.2)
RBC # FLD: 13.9 % — SIGNIFICANT CHANGE UP (ref 10.3–14.5)
SODIUM SERPL-SCNC: 139 MMOL/L — SIGNIFICANT CHANGE UP (ref 135–145)
WBC # BLD: 7.23 K/UL — SIGNIFICANT CHANGE UP (ref 3.8–10.5)
WBC # FLD AUTO: 7.23 K/UL — SIGNIFICANT CHANGE UP (ref 3.8–10.5)

## 2018-08-30 PROCEDURE — 99232 SBSQ HOSP IP/OBS MODERATE 35: CPT

## 2018-08-30 RX ADMIN — POLYETHYLENE GLYCOL 3350 17 GRAM(S): 17 POWDER, FOR SOLUTION ORAL at 12:04

## 2018-08-30 RX ADMIN — ENOXAPARIN SODIUM 40 MILLIGRAM(S): 100 INJECTION SUBCUTANEOUS at 12:04

## 2018-08-30 RX ADMIN — Medication 100 MILLIGRAM(S): at 09:33

## 2018-08-30 RX ADMIN — OXYCODONE HYDROCHLORIDE 5 MILLIGRAM(S): 5 TABLET ORAL at 08:30

## 2018-08-30 RX ADMIN — Medication 100 MILLIGRAM(S): at 00:53

## 2018-08-30 RX ADMIN — OXYCODONE HYDROCHLORIDE 5 MILLIGRAM(S): 5 TABLET ORAL at 08:38

## 2018-08-30 RX ADMIN — Medication 81 MILLIGRAM(S): at 12:04

## 2018-08-30 RX ADMIN — Medication 100 MILLIGRAM(S): at 21:43

## 2018-08-30 RX ADMIN — OXYCODONE HYDROCHLORIDE 5 MILLIGRAM(S): 5 TABLET ORAL at 18:00

## 2018-08-30 RX ADMIN — OXYCODONE HYDROCHLORIDE 5 MILLIGRAM(S): 5 TABLET ORAL at 17:58

## 2018-08-30 RX ADMIN — Medication 100 MILLIGRAM(S): at 14:12

## 2018-08-30 NOTE — OCCUPATIONAL THERAPY INITIAL EVALUATION ADULT - PATIENT/FAMILY/SIGNIFICANT OTHER GOALS STATEMENT, OT EVAL
Pt. would like to go home upon d/c from [facility]. Pt. would like to go home upon d/c from [facility] with homecare services.

## 2018-08-30 NOTE — PROGRESS NOTE ADULT - SUBJECTIVE AND OBJECTIVE BOX
Patient seen and examined at bedside. Reports no acute events overnight. Pain is well controlled.    PHYSICAL EXAM:  Vital Signs Last 24 Hrs  T(C): 36.6 (30 Aug 2018 04:51), Max: 36.8 (29 Aug 2018 11:48)  T(F): 97.8 (30 Aug 2018 04:51), Max: 98.2 (29 Aug 2018 11:48)  HR: 75 (30 Aug 2018 04:51) (71 - 90)  BP: 97/60 (30 Aug 2018 04:51) (97/60 - 163/79)  RR: 16 (30 Aug 2018 04:51) (12 - 18)  SpO2: 96% (30 Aug 2018 04:51) (93% - 100%)    Gen: NAD; Resting comfortably  RLE: Acquacel bandage in place; Clean/Dry/Intact  SILT L3-S1  +EHL/FHL/TA/GSC  +DP  Compartments soft and compressible  No calf tenderness  Venodynes in place

## 2018-08-30 NOTE — PROGRESS NOTE ADULT - SUBJECTIVE AND OBJECTIVE BOX
The patient was interviewed and evaluated.    89y Female    T(C): 36.9 (08-30-18 @ 07:10), Max: 36.9 (08-30-18 @ 07:10)  HR: 80 (08-30-18 @ 07:10) (71 - 90)  BP: 92/54 (08-30-18 @ 07:10) (92/54 - 163/79)  RR: 16 (08-30-18 @ 07:10) (12 - 18)  SpO2: 90% (08-30-18 @ 07:10) (90% - 100%)  Wt(kg): --    No Nausea/vomiting, recall, sore throat or headache.    No anesthesia related complaints or sequelae.

## 2018-08-30 NOTE — OCCUPATIONAL THERAPY INITIAL EVALUATION ADULT - BED MOBILITY TRAINING, PT EVAL
Patient will perform bed mobility skills (supine to sit and sit to supine) with minimal assistance in 2-4 sessions.

## 2018-08-30 NOTE — PROGRESS NOTE ADULT - ASSESSMENT
A/P: 89F s/p IMN POD 1  Analgesia  DVT ppx  WBAT RLE  Encourage incentive spirometry  FU labs  PT/OT  Will discuss with attending and advise if plan changes

## 2018-08-30 NOTE — OCCUPATIONAL THERAPY INITIAL EVALUATION ADULT - PERTINENT HX OF CURRENT PROBLEM, REHAB EVAL
89 y/o female s/p IM nailing right femur on 8/29/18 by Dr. Sheriff due to IT fx s/p fall in parking lot. 89 y/o female s/p IM nailing right femur on 8/29/18 by Dr. Sheriff due to IT fx s/p fall in parking lot. Pt became hypotensive while seated in bedside chair (81/49) and returned to supine in bed with 2 liters O2 bp 104/65. Bruno RN notified re: patient status.

## 2018-08-30 NOTE — OCCUPATIONAL THERAPY INITIAL EVALUATION ADULT - ORIENTATION, REHAB EVAL
oriented to person, place, time and situation oriented to person, place, time and situation/Pt educated regarding fall prevention in hospital and recommendation to use call bell/ask for assistance with all ADL's/transfers etc.

## 2018-08-30 NOTE — PROGRESS NOTE ADULT - SUBJECTIVE AND OBJECTIVE BOX
CHIEF COMPLAINT/INTERVAL HISTORY:  Pt. seen and evaluated for R. hip fracture.  POD#1 s/p IMN of R. hip.  Pt. is in no distress.  Reports pain is controlled.  Eating breakfast.      REVIEW OF SYSTEMS:  No fever, CP, SOB, or abdominal pain    Vital Signs Last 24 Hrs  T(C): 36.9 (30 Aug 2018 07:10), Max: 36.9 (30 Aug 2018 07:10)  T(F): 98.4 (30 Aug 2018 07:10), Max: 98.4 (30 Aug 2018 07:10)  HR: 80 (30 Aug 2018 07:10) (71 - 90)  BP: 92/54 (30 Aug 2018 07:10) (92/54 - 163/79)  BP(mean): --  RR: 16 (30 Aug 2018 07:10) (12 - 18)  SpO2: 90% (30 Aug 2018 07:10) (90% - 100%)    PHYSICAL EXAM:  GENERAL: NAD  HEENT: EOMI, hearing normal, conjunctiva and sclera clear  Chest: CTA bilaterally, no wheezing  CV: S1S2, RRR,   GI: soft, +BS, NT/ND  Musculoskeletal: no edema, dressings over R. hip  Psychiatric: affect nL, mood nL  Skin: warm and dry    LABS:                        10.9   7.23  )-----------( 165      ( 30 Aug 2018 06:19 )             31.9     08-30    139  |  107  |  15  ----------------------------<  119<H>  3.7   |  24  |  0.61    Ca    7.3<L>      30 Aug 2018 06:19    TPro  6.7  /  Alb  3.4  /  TBili  0.5  /  DBili  x   /  AST  17  /  ALT  20  /  AlkPhos  79  08-28    PT/INR - ( 29 Aug 2018 06:34 )   PT: 11.9 sec;   INR: 1.09 ratio         PTT - ( 29 Aug 2018 06:34 )  PTT:27.5 sec      Assessment and Plan:  -R. hip fracture:  Analgesics PRN.  WBAT.  PT evaluation.  Orthopedic surgery.   -Post op Anemia:  Repeat hbg@2PM    -VTE ppx:  Lovenox 40mg SQ daily

## 2018-08-30 NOTE — OCCUPATIONAL THERAPY INITIAL EVALUATION ADULT - GENERAL OBSERVATIONS, REHAB EVAL
Pt found supine in bed +IV, +bandage right hip, supplemental oxygen Pt found supine in bed +IV and +bandages (2) right hip. Dtr Tiara present in room.

## 2018-08-30 NOTE — OCCUPATIONAL THERAPY INITIAL EVALUATION ADULT - ADDITIONAL COMMENTS
Pt is staying with her daughter Tiara and son-in-law while in [place]. Pt lives in [place] part of the year. 2 steps with no handrail to enter house and +chairlift to 2nd level (bedroom and bathtub). Bedroom available main floor. Bathtub with doors and 4-5 grab bars. Pt does not own any DME. Pt drives a car. Pt is staying with her daughter Tiara and son-in-law while in [place]. Pt lives in [place] part of the year. 2 steps with no handrail to enter house and +chairlift to 2nd level (bedroom and bathtub). Bedroom available main floor. Bathtub with doors and 4-5 grab bars. Pt does not own any DME. Pt is right hand dominant and exhibits significant arthritic changes right hand; MP joints ~1/2 range digit #'s 2 & 3, digit #'s 4 & 5 possible Dupuytren's contracture, can oppose digits 2 & 3 only. Pt reports that arthritic changes b/l hands do not affect her ability to perform ADL's.

## 2018-08-31 VITALS
RESPIRATION RATE: 16 BRPM | TEMPERATURE: 98 F | OXYGEN SATURATION: 91 % | SYSTOLIC BLOOD PRESSURE: 117 MMHG | HEART RATE: 82 BPM | DIASTOLIC BLOOD PRESSURE: 69 MMHG

## 2018-08-31 LAB
ANION GAP SERPL CALC-SCNC: 5 MMOL/L — SIGNIFICANT CHANGE UP (ref 5–17)
BASOPHILS # BLD AUTO: 0.03 K/UL — SIGNIFICANT CHANGE UP (ref 0–0.2)
BASOPHILS NFR BLD AUTO: 0.4 % — SIGNIFICANT CHANGE UP (ref 0–2)
BUN SERPL-MCNC: 22 MG/DL — SIGNIFICANT CHANGE UP (ref 7–23)
CALCIUM SERPL-MCNC: 7.7 MG/DL — LOW (ref 8.5–10.1)
CHLORIDE SERPL-SCNC: 105 MMOL/L — SIGNIFICANT CHANGE UP (ref 96–108)
CO2 SERPL-SCNC: 28 MMOL/L — SIGNIFICANT CHANGE UP (ref 22–31)
CREAT SERPL-MCNC: 0.52 MG/DL — SIGNIFICANT CHANGE UP (ref 0.5–1.3)
EOSINOPHIL # BLD AUTO: 0.22 K/UL — SIGNIFICANT CHANGE UP (ref 0–0.5)
EOSINOPHIL NFR BLD AUTO: 3.2 % — SIGNIFICANT CHANGE UP (ref 0–6)
GLUCOSE SERPL-MCNC: 108 MG/DL — HIGH (ref 70–99)
HCT VFR BLD CALC: 30.1 % — LOW (ref 34.5–45)
HGB BLD-MCNC: 10.2 G/DL — LOW (ref 11.5–15.5)
IMM GRANULOCYTES NFR BLD AUTO: 0.6 % — SIGNIFICANT CHANGE UP (ref 0–1.5)
LYMPHOCYTES # BLD AUTO: 1.14 K/UL — SIGNIFICANT CHANGE UP (ref 1–3.3)
LYMPHOCYTES # BLD AUTO: 16.4 % — SIGNIFICANT CHANGE UP (ref 13–44)
MCHC RBC-ENTMCNC: 30.9 PG — SIGNIFICANT CHANGE UP (ref 27–34)
MCHC RBC-ENTMCNC: 33.9 GM/DL — SIGNIFICANT CHANGE UP (ref 32–36)
MCV RBC AUTO: 91.2 FL — SIGNIFICANT CHANGE UP (ref 80–100)
MONOCYTES # BLD AUTO: 0.83 K/UL — SIGNIFICANT CHANGE UP (ref 0–0.9)
MONOCYTES NFR BLD AUTO: 11.9 % — SIGNIFICANT CHANGE UP (ref 2–14)
NEUTROPHILS # BLD AUTO: 4.71 K/UL — SIGNIFICANT CHANGE UP (ref 1.8–7.4)
NEUTROPHILS NFR BLD AUTO: 67.5 % — SIGNIFICANT CHANGE UP (ref 43–77)
PLATELET # BLD AUTO: 141 K/UL — LOW (ref 150–400)
POTASSIUM SERPL-MCNC: 3.9 MMOL/L — SIGNIFICANT CHANGE UP (ref 3.5–5.3)
POTASSIUM SERPL-SCNC: 3.9 MMOL/L — SIGNIFICANT CHANGE UP (ref 3.5–5.3)
RBC # BLD: 3.3 M/UL — LOW (ref 3.8–5.2)
RBC # FLD: 13.8 % — SIGNIFICANT CHANGE UP (ref 10.3–14.5)
SODIUM SERPL-SCNC: 138 MMOL/L — SIGNIFICANT CHANGE UP (ref 135–145)
WBC # BLD: 6.97 K/UL — SIGNIFICANT CHANGE UP (ref 3.8–10.5)
WBC # FLD AUTO: 6.97 K/UL — SIGNIFICANT CHANGE UP (ref 3.8–10.5)

## 2018-08-31 PROCEDURE — 99239 HOSP IP/OBS DSCHRG MGMT >30: CPT

## 2018-08-31 RX ORDER — POLYETHYLENE GLYCOL 3350 17 G/17G
17 POWDER, FOR SOLUTION ORAL
Qty: 0 | Refills: 0 | COMMUNITY
Start: 2018-08-31

## 2018-08-31 RX ORDER — ENOXAPARIN SODIUM 100 MG/ML
40 INJECTION SUBCUTANEOUS
Qty: 0 | Refills: 0 | COMMUNITY
Start: 2018-08-31

## 2018-08-31 RX ORDER — ACETAMINOPHEN 500 MG
2 TABLET ORAL
Qty: 0 | Refills: 0 | COMMUNITY
Start: 2018-08-31

## 2018-08-31 RX ORDER — DOCUSATE SODIUM 100 MG
1 CAPSULE ORAL
Qty: 0 | Refills: 0 | COMMUNITY
Start: 2018-08-31

## 2018-08-31 RX ORDER — OXYCODONE HYDROCHLORIDE 5 MG/1
1 TABLET ORAL
Qty: 0 | Refills: 0 | COMMUNITY
Start: 2018-08-31

## 2018-08-31 RX ADMIN — OXYCODONE HYDROCHLORIDE 5 MILLIGRAM(S): 5 TABLET ORAL at 00:10

## 2018-08-31 RX ADMIN — Medication 81 MILLIGRAM(S): at 11:28

## 2018-08-31 RX ADMIN — ENOXAPARIN SODIUM 40 MILLIGRAM(S): 100 INJECTION SUBCUTANEOUS at 11:28

## 2018-08-31 RX ADMIN — OXYCODONE HYDROCHLORIDE 5 MILLIGRAM(S): 5 TABLET ORAL at 07:30

## 2018-08-31 RX ADMIN — OXYCODONE HYDROCHLORIDE 5 MILLIGRAM(S): 5 TABLET ORAL at 00:45

## 2018-08-31 RX ADMIN — Medication 100 MILLIGRAM(S): at 05:14

## 2018-08-31 RX ADMIN — POLYETHYLENE GLYCOL 3350 17 GRAM(S): 17 POWDER, FOR SOLUTION ORAL at 11:28

## 2018-08-31 RX ADMIN — OXYCODONE HYDROCHLORIDE 5 MILLIGRAM(S): 5 TABLET ORAL at 06:48

## 2018-08-31 RX ADMIN — Medication 100 MILLIGRAM(S): at 13:29

## 2018-08-31 NOTE — PROGRESS NOTE ADULT - SUBJECTIVE AND OBJECTIVE BOX
CHIEF COMPLAINT/INTERVAL HISTORY:  Pt. seen and evaluated for R. hip fracture.  Pt. is POD#2 s/p IMN.  Pt. reports no significant pain while at rest.  Only with motion of the hip does she have some pain.  No events over the night.      REVIEW OF SYSTEMS:  No fever, CP, SOB, or abdominal pain.      Vital Signs Last 24 Hrs  T(C): 36.7 (31 Aug 2018 07:13), Max: 37.2 (30 Aug 2018 17:20)  T(F): 98.1 (31 Aug 2018 07:13), Max: 98.9 (30 Aug 2018 17:20)  HR: 82 (31 Aug 2018 07:13) (82 - 100)  BP: 117/69 (31 Aug 2018 07:13) (83/40 - 131/70)  BP(mean): --  RR: 16 (31 Aug 2018 07:13) (16 - 17)  SpO2: 91% (31 Aug 2018 07:13) (91% - 94%)    PHYSICAL EXAM:  GENERAL: NAD  HEENT: EOMI, hearing normal, conjunctiva and sclera clear  Chest: CTA bilaterally, no wheezing  CV: S1S2, RRR,   GI: soft, +BS, NT/ND  Musculoskeletal: no edema, dressings over R. hip  Psychiatric: affect nL, mood nL  Skin: warm and dry    LABS:                        10.2   6.97  )-----------( 141      ( 31 Aug 2018 06:22 )             30.1     08-31    138  |  105  |  22  ----------------------------<  108<H>  3.9   |  28  |  0.52    Ca    7.7<L>      31 Aug 2018 06:22      Assessment and Plan:  -R. hip fracture:  Analgesics PRN.  WBAT.  Orthopedic surgery f/u.   -Post op Anemia:  stable  -VTE ppx:  Lovenox 40mg SQ daily

## 2018-08-31 NOTE — PROGRESS NOTE ADULT - SUBJECTIVE AND OBJECTIVE BOX
Pt S/E at bedside, no acute events overnight, pain controlled while in bed. Difficulty with ambulation 2/2 pain.     Vital Signs Last 24 Hrs  T(C): 36.7 (31 Aug 2018 07:13), Max: 37.2 (30 Aug 2018 17:20)  T(F): 98.1 (31 Aug 2018 07:13), Max: 98.9 (30 Aug 2018 17:20)  HR: 82 (31 Aug 2018 07:13) (82 - 100)  BP: 117/69 (31 Aug 2018 07:13) (83/40 - 131/70)  BP(mean): --  RR: 16 (31 Aug 2018 07:13) (16 - 17)  SpO2: 91% (31 Aug 2018 07:13) (91% - 94%)    Gen: NAD, AAOx3    RLE:  Dressing clean dry intact, mild soak through on distal dressing, okay to leave for now  +EHL/FHL/TA/GS  SILT L3-S1  +DP/PT Pulses  Compartments soft  No calf TTP B/L

## 2018-08-31 NOTE — PROGRESS NOTE ADULT - ASSESSMENT
a/p 89 yo F w/ R IT Fracture s/p Intramedulary nailing POD 2  Pain control  DVT ppx w/ Lovenox x 30 days  WBAT  PT/OT  [facility] vs Home with family  Dressing change POD 3

## 2018-10-24 PROCEDURE — 71045 X-RAY EXAM CHEST 1 VIEW: CPT

## 2018-10-24 PROCEDURE — 85730 THROMBOPLASTIN TIME PARTIAL: CPT

## 2018-10-24 PROCEDURE — 76001: CPT

## 2018-10-24 PROCEDURE — 85027 COMPLETE CBC AUTOMATED: CPT

## 2018-10-24 PROCEDURE — 97116 GAIT TRAINING THERAPY: CPT

## 2018-10-24 PROCEDURE — 85610 PROTHROMBIN TIME: CPT

## 2018-10-24 PROCEDURE — 97110 THERAPEUTIC EXERCISES: CPT

## 2018-10-24 PROCEDURE — 93005 ELECTROCARDIOGRAM TRACING: CPT

## 2018-10-24 PROCEDURE — 97530 THERAPEUTIC ACTIVITIES: CPT

## 2018-10-24 PROCEDURE — 73502 X-RAY EXAM HIP UNI 2-3 VIEWS: CPT

## 2018-10-24 PROCEDURE — 97535 SELF CARE MNGMENT TRAINING: CPT

## 2018-10-24 PROCEDURE — 85014 HEMATOCRIT: CPT

## 2018-10-24 PROCEDURE — C1889: CPT

## 2018-10-24 PROCEDURE — 86850 RBC ANTIBODY SCREEN: CPT

## 2018-10-24 PROCEDURE — 99285 EMERGENCY DEPT VISIT HI MDM: CPT | Mod: 25

## 2018-10-24 PROCEDURE — 86901 BLOOD TYPING SEROLOGIC RH(D): CPT

## 2018-10-24 PROCEDURE — 97165 OT EVAL LOW COMPLEX 30 MIN: CPT

## 2018-10-24 PROCEDURE — 85018 HEMOGLOBIN: CPT

## 2018-10-24 PROCEDURE — 80048 BASIC METABOLIC PNL TOTAL CA: CPT

## 2018-10-24 PROCEDURE — 97162 PT EVAL MOD COMPLEX 30 MIN: CPT

## 2018-10-24 PROCEDURE — 80053 COMPREHEN METABOLIC PANEL: CPT

## 2018-10-24 PROCEDURE — C1713: CPT

## 2018-10-24 PROCEDURE — 73552 X-RAY EXAM OF FEMUR 2/>: CPT

## 2018-10-24 PROCEDURE — 86900 BLOOD TYPING SEROLOGIC ABO: CPT

## 2018-12-14 NOTE — ED ADULT NURSE REASSESSMENT NOTE - NSFALLRSKPASTHIST_ED_ALL_ED
Vaccine Information Sheet, \"Influenza - Inactivated\"  given to Shelby Baugh, or parent/legal guardian of  Shelby Baugh and verbalized understanding. Patient responses:    Have you ever had a reaction to a flu vaccine? No  Are you able to eat eggs without adverse effects? No  Do you have any current illness? No  Have you ever had [place] Syndrome? No    Flu vaccine given per order. Please see immunization tab. yes

## 2020-10-05 NOTE — PHYSICAL THERAPY INITIAL EVALUATION ADULT - LIVES WITH, PROFILE
Hpi Title: Evaluation of Skin Lesions
How Severe Are Your Spot(S)?: moderate
Have Your Spot(S) Been Treated In The Past?: has not been treated
daughter

## 2021-03-31 NOTE — H&P ADULT - PROBLEM SELECTOR PLAN 2
SCDs for DVT ppx  Regular diet until midnight, NPO after midnight for procedure  Strict bed rest    IMPROVE VTE Individual Risk Assessment          RISK                                                          Points    [  ] Previous VTE                                                3  [  ] Thrombophilia                                             2  [  ] Lower limb paralysis                                   2        (unable to hold up >15 seconds)    [  ] Current Cancer                                            2         (within 6 months)  [  ] Immobilization > 24 hrs                              1  [  ] ICU/CCU stay > 24 hours                            1  [  ] Age > 60                                                    1    IMPROVE VTE Score ___2______ Spouse

## 2023-06-12 NOTE — ED ADULT NURSE REASSESSMENT NOTE - NS ED NURSE REASSESS COMMENT FT1
Report taken from Evie OVALLES. Pt received alert and oriented x 4 c/o hip pain only on movement, otherwise comfortable. No acute distress. Awaiting bed. Postop Diagnosis: same
